# Patient Record
Sex: MALE
[De-identification: names, ages, dates, MRNs, and addresses within clinical notes are randomized per-mention and may not be internally consistent; named-entity substitution may affect disease eponyms.]

---

## 2020-10-05 ENCOUNTER — NURSE TRIAGE (OUTPATIENT)
Dept: OTHER | Facility: CLINIC | Age: 55
End: 2020-10-05

## 2020-10-05 ENCOUNTER — TELEPHONE (OUTPATIENT)
Dept: OTHER | Facility: CLINIC | Age: 55
End: 2020-10-05

## 2020-10-05 NOTE — TELEPHONE ENCOUNTER
Client MMO, POD 5  Caller with c/o burning under skin, abd. Muscle pain in same site, thinks he may have shingles. See below assessment. Attention provider: your patient utilized nurse triage services offered by employer, payer, or community. This encounter includes an overview of the reason for call, assessment and recommended disposition. Please do not respond through this encounter as the response is not directed to a shared pool. Reason for Disposition   Shingles rash (matches SYMPTOMS) and onset within past 72 hours    Additional Information   Negative: Fever > 100.4 F (38.0 C)     Current temp 97.6 oral    Answer Assessment - Initial Assessment Questions  1. APPEARANCE of RASH: \"Describe the rash. \"       One small spot inch above navel, scab, dry    2. LOCATION: \"Where is the rash located? \"       Navel to R flank and around to spine    3. ONSET: \"When did the rash start? \"       Late last week    4. ITCHING: \"Does the rash itch? \" If so, ask: \"How bad is the itch? \"  (Scale 1-10; or mild, moderate, severe)      No    5. PAIN: \"Does the rash hurt? \" If so, ask: \"How bad is the pain? \"  (Scale 1-10; or mild, moderate, severe)      Yes, 2/10    6. OTHER SYMPTOMS: \"Do you have any other symptoms? \" (e.g., fever)      Burning    7. PREGNANCY: \"Is there any chance you are pregnant? \" \"When was your last menstrual period? \"      n/a    Protocols used: [id number]

## 2021-11-04 ENCOUNTER — NURSE TRIAGE (OUTPATIENT)
Dept: OTHER | Facility: CLINIC | Age: 56
End: 2021-11-04